# Patient Record
(demographics unavailable — no encounter records)

---

## 2025-05-28 NOTE — DISCUSSION/SUMMARY
[FreeTextEntry1] : 73-year-old female with acute PIP dislocations that were reduced now 1 week ago by herself to the right small ring and middle finger with a small avulsion fracture seen to the small finger.  Has good range of motion and moderate swelling.  I placed her back in a ulnar gutter splint as she is flying home to [facility] on an airplane today and I wanted her to be comfortable.  We discussed that she should remove the splint tomorrow and start gentle range of motion and no heavy lifting.  She should find a hand therapist to start hand therapy this week or early next week to prevent permanent stiffness.  All questions were answered.  I have spent 35 minutes of time on the encounter which excludes teaching and/or separately reported services

## 2025-05-28 NOTE — PHYSICAL EXAM
[de-identified] : Right hand SF, MF and RF + swelling and minor ecchymosis volar FDS and FDP intact SITLT to SF UDN and RDN - transverse lac VZ2 no signs of infection Can almost make a full fist Full extension noted  [de-identified] :   Xray with 3 views of the right hand was done in office today, 5/28/25 , and reviewed by Dr. Maza, demonstrated congruent PIP joints, small avulsion fracture off the SF from base of P2

## 2025-05-28 NOTE — HISTORY OF PRESENT ILLNESS
[FreeTextEntry1] : Right hand PIP MF, RF and SF PIP dislocations s/p fall DOI 5/22/25  This is a very pleasant 73-year-old female presenting to me for right hand PIP dislocations to her small ring and middle finger last week after a fall from standing.  She reports that she was able to reduce all 3 fingers herself however had a laceration to the volar small finger and was seen in the emergency room where this was repaired.  She was also subsequently splinted in a clamshell splint until today.  Reports pain is well-controlled.  Lives in [place] and is flying home tonight.